# Patient Record
Sex: MALE | Race: AMERICAN INDIAN OR ALASKA NATIVE
[De-identification: names, ages, dates, MRNs, and addresses within clinical notes are randomized per-mention and may not be internally consistent; named-entity substitution may affect disease eponyms.]

---

## 2018-07-04 NOTE — XRAY REPORT
FINAL REPORT



PROCEDURE:  XR FOOT 3+V RT



TECHNIQUE:  RIGHT foot radiographs, AP, lateral, and oblique

views. CPT 20473







HISTORY:  Right foot pain. 



COMPARISON:  No prior studies are available for comparison.



FINDINGS:  

Fracture (s) and/or Dislocation(s): None .



Alignment: Normal .



Joint space(s): Mild narrowing and osteophytes of the midfoot. 



Soft tissues: Normal .



Bone mineralization: Normal .



Foreign bodies: None .



Calcaneal spurring: None .







IMPRESSION:  

No radiographic evidence of acute abnormality.
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
## 2020-07-05 ENCOUNTER — HOSPITAL ENCOUNTER (EMERGENCY)
Dept: HOSPITAL 5 - ED | Age: 60
Discharge: HOME | End: 2020-07-05
Payer: COMMERCIAL

## 2020-07-05 VITALS — SYSTOLIC BLOOD PRESSURE: 123 MMHG | DIASTOLIC BLOOD PRESSURE: 54 MMHG

## 2020-07-05 DIAGNOSIS — S80.811A: ICD-10-CM

## 2020-07-05 DIAGNOSIS — S40.812A: ICD-10-CM

## 2020-07-05 DIAGNOSIS — F12.10: ICD-10-CM

## 2020-07-05 DIAGNOSIS — F17.200: ICD-10-CM

## 2020-07-05 DIAGNOSIS — S92.411A: Primary | ICD-10-CM
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
## 2020-07-05 DIAGNOSIS — S40.811A: ICD-10-CM
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
## 2020-07-05 DIAGNOSIS — S80.812A: ICD-10-CM

## 2020-07-05 PROCEDURE — 96375 TX/PRO/DX INJ NEW DRUG ADDON: CPT

## 2020-07-05 PROCEDURE — 73630 X-RAY EXAM OF FOOT: CPT

## 2020-07-05 PROCEDURE — 29515 APPLICATION SHORT LEG SPLINT: CPT

## 2020-07-05 PROCEDURE — 99283 EMERGENCY DEPT VISIT LOW MDM: CPT

## 2020-07-05 PROCEDURE — 73610 X-RAY EXAM OF ANKLE: CPT

## 2020-07-05 PROCEDURE — 96374 THER/PROPH/DIAG INJ IV PUSH: CPT
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
## 2020-08-01 ENCOUNTER — HOSPITAL ENCOUNTER (EMERGENCY)
Dept: HOSPITAL 5 - ED | Age: 60
Discharge: LEFT BEFORE DISCHARGE | End: 2020-08-01
Payer: COMMERCIAL

## 2020-08-01 VITALS — SYSTOLIC BLOOD PRESSURE: 166 MMHG | DIASTOLIC BLOOD PRESSURE: 88 MMHG

## 2020-08-01 DIAGNOSIS — Z53.21: ICD-10-CM

## 2020-08-01 DIAGNOSIS — M25.511: Primary | ICD-10-CM
